# Patient Record
Sex: MALE | Race: WHITE
[De-identification: names, ages, dates, MRNs, and addresses within clinical notes are randomized per-mention and may not be internally consistent; named-entity substitution may affect disease eponyms.]

---

## 2019-11-01 ENCOUNTER — HOSPITAL ENCOUNTER (OUTPATIENT)
Dept: HOSPITAL 95 - ORSCMMR | Age: 44
Discharge: HOME | End: 2019-11-01
Attending: SURGERY
Payer: OTHER GOVERNMENT

## 2019-11-01 VITALS — WEIGHT: 315 LBS | BODY MASS INDEX: 37.19 KG/M2 | HEIGHT: 77.01 IN

## 2019-11-01 DIAGNOSIS — K21.9: ICD-10-CM

## 2019-11-01 DIAGNOSIS — G47.33: ICD-10-CM

## 2019-11-01 DIAGNOSIS — E66.01: ICD-10-CM

## 2019-11-01 DIAGNOSIS — D12.0: ICD-10-CM

## 2019-11-01 DIAGNOSIS — Z79.899: ICD-10-CM

## 2019-11-01 DIAGNOSIS — R19.7: Primary | ICD-10-CM

## 2019-11-01 DIAGNOSIS — K22.70: ICD-10-CM

## 2019-11-01 DIAGNOSIS — K64.8: ICD-10-CM

## 2019-11-01 DIAGNOSIS — K44.9: ICD-10-CM

## 2019-11-01 PROCEDURE — 0DB78ZX EXCISION OF STOMACH, PYLORUS, VIA NATURAL OR ARTIFICIAL OPENING ENDOSCOPIC, DIAGNOSTIC: ICD-10-PCS | Performed by: SURGERY

## 2019-11-01 PROCEDURE — 0DB58ZX EXCISION OF ESOPHAGUS, VIA NATURAL OR ARTIFICIAL OPENING ENDOSCOPIC, DIAGNOSTIC: ICD-10-PCS | Performed by: SURGERY

## 2019-11-01 PROCEDURE — 0DBH8ZX EXCISION OF CECUM, VIA NATURAL OR ARTIFICIAL OPENING ENDOSCOPIC, DIAGNOSTIC: ICD-10-PCS | Performed by: SURGERY

## 2019-11-01 NOTE — NUR
11/01/19 1158 JOHN PENDLETON
See Anesthesia record.
Bite Block Placed.3-LEAD EKG REVIEWED WITH PHYSICIAN PRIOR TO START OF
PROCEDURE.Patient to ENDO 1.History, Chart, Medications and Allergies
reviewed before start of procedure.MONITOR INTACT WITH CONTINUOUS
PULSE OXIMETRY AND INTERMITTENT BP.O2 VIA N/C INTACT THROUGHOUT
SEDATION/PROCEDURE.

## 2019-11-01 NOTE — NUR
Patient States Post-Procedure ride home has been arranged.
pt tolerated po fluids and food well.
iv dc'd intact.
Discharge instructions reviewed with patient. Patient verbalizes understanding.
Copy given to patient to take home.